# Patient Record
Sex: MALE | Race: WHITE | Employment: OTHER | URBAN - METROPOLITAN AREA
[De-identification: names, ages, dates, MRNs, and addresses within clinical notes are randomized per-mention and may not be internally consistent; named-entity substitution may affect disease eponyms.]

---

## 2017-01-12 ENCOUNTER — OFFICE VISIT (OUTPATIENT)
Dept: FAMILY MEDICINE CLINIC | Age: 57
End: 2017-01-12

## 2017-01-12 VITALS
OXYGEN SATURATION: 96 % | TEMPERATURE: 96.8 F | HEART RATE: 86 BPM | BODY MASS INDEX: 38.78 KG/M2 | SYSTOLIC BLOOD PRESSURE: 128 MMHG | RESPIRATION RATE: 13 BRPM | HEIGHT: 71 IN | DIASTOLIC BLOOD PRESSURE: 91 MMHG | WEIGHT: 277 LBS

## 2017-01-12 DIAGNOSIS — F51.01 PRIMARY INSOMNIA: ICD-10-CM

## 2017-01-12 DIAGNOSIS — E11.8 CONTROLLED TYPE 2 DIABETES MELLITUS WITH COMPLICATION, WITH LONG-TERM CURRENT USE OF INSULIN (HCC): ICD-10-CM

## 2017-01-12 DIAGNOSIS — K21.9 GASTROESOPHAGEAL REFLUX DISEASE WITHOUT ESOPHAGITIS: ICD-10-CM

## 2017-01-12 DIAGNOSIS — E78.00 PURE HYPERCHOLESTEROLEMIA: ICD-10-CM

## 2017-01-12 DIAGNOSIS — I10 ESSENTIAL HYPERTENSION: Primary | ICD-10-CM

## 2017-01-12 DIAGNOSIS — Z79.4 CONTROLLED TYPE 2 DIABETES MELLITUS WITH COMPLICATION, WITH LONG-TERM CURRENT USE OF INSULIN (HCC): ICD-10-CM

## 2017-01-12 DIAGNOSIS — E55.9 VITAMIN D DEFICIENCY: ICD-10-CM

## 2017-01-12 NOTE — PROGRESS NOTES
History of Present Illness  Brandi Rosenthal is a 49 y.o. male who presents today for management of    Chief Complaint   Patient presents with    Hypertension    Cholesterol Problem    Diabetes       Patient still complains of on and off dizziness when bending over. He will have carotid artery ultrasound for work-up of right eye retinopathy. DM - fasting blood sugar [phone]-. He takes Lantus 26 units daily. Home BP readings 140/78, 124/79, 130/94, 94/69, 124/78, 104/70  Patient complains of a foul-smelling nasal discharge from his left nostril. It is associated with headache and facial pressure. He states that it started clearing up. He had surgery for deviated septum. He denies any fever or chills. Problem List  Patient Active Problem List    Diagnosis Date Noted    Vitamin D deficiency 01/12/2017    Status post shoulder surgery 11/01/2016    Diabetes mellitus type 2, controlled ([facility] [address] 03/28/2016    Hyperlipidemia 03/28/2016    Essential hypertension 03/28/2016    Gastroesophageal reflux disease without esophagitis 03/28/2016    Obesity 03/28/2016    Retinopathy 03/28/2016    Primary insomnia 03/28/2016    Vasculogenic erectile dysfunction 03/28/2016       Current Medications  Current Outpatient Prescriptions   Medication Sig Dispense    RANITIDINE HCL (ZANTAC PO) Take  by mouth.  zolpidem (AMBIEN) 10 mg tablet Take 1 Tab by mouth nightly as needed for Sleep. Max Daily Amount: 10 mg. 30 Tab    gabapentin (NEURONTIN) 300 mg capsule Take 1 Cap by mouth three (3) times daily. (Patient taking differently: Take 100 mg by mouth three (3) times daily.) 90 Cap    omeprazole (PRILOSEC) 20 mg capsule Take 2 Caps by mouth daily. 60 Cap    glucose blood VI test strips (BLOOD GLUCOSE TEST) strip Precision Xtra  Use once a day 100 Strip    atorvastatin (LIPITOR) 40 mg tablet Take  by mouth daily.  insulin glargine (LANTUS) 100 unit/mL injection by SubCUTAneous route nightly.  aspirin delayed-release 81 mg tablet Take  by mouth daily.  omega-3 fatty acids-vitamin e 1,000 mg cap Take 1 Cap by mouth.  Glucosamine &Chondroit-MV-Min3 790-365-74-0.5 mg tab Take  by mouth.  bethanie's wort 300 mg cap Take  by mouth.  ascorbic acid (VITAMIN C) 500 mg tablet Take  by mouth.  GINSENG PO Take  by mouth.  CYANOCOBALAMIN, VITAMIN B-12, (VITAMIN B-12 PO) Take  by mouth.  lisinopril (PRINIVIL, ZESTRIL) 20 mg tablet Take 1 Tab by mouth daily. 90 Tab     No current facility-administered medications for this visit. Allergies/Drug Reactions  Allergies   Allergen Reactions    Metformin Other (comments)     GI symptoms        Review of Systems  General ROS: negative for - chills, fatigue or fever  Ophthalmic ROS: negative for - blurry vision  ENT ROS: positive for - nasal discharge  negative for - epistaxis, oral lesions, sore throat, tinnitus or vertigo  Endocrine ROS: negative  Respiratory ROS: no cough, shortness of breath, or wheezing  Cardiovascular ROS: no chest pain or dyspnea on exertion  Gastrointestinal ROS: no abdominal pain, change in bowel habits, or black or bloody stools  positive for - heartburn  Genito-Urinary ROS: no dysuria, trouble voiding, or hematuria  Musculoskeletal ROS: negative  Neurological ROS: negative for - dizziness, headaches, numbness/tingling or weakness      Physical Exam  Vital signs:   Vitals:    01/12/17 1141   BP: (!) 128/91   Pulse: 86   Resp: 13   Temp: 96.8 °F (36 °C)   TempSrc: Oral   SpO2: 96%   Weight: 277 lb (125.6 kg)   Height: 5' 11\" (1.803 m)       General: alert, oriented, not in distress  Chest/Lungs: clear breath sounds, no wheezing or crackles  Heart: normal rate, regular rhythm, no murmur  Abdomen: soft, non-distended, non-tender, normal bowel sounds, no organomegaly, no masses  Extremities: no focal deformities, no edema    Assessment/Plan:    1.  Essential hypertension  - controlled  - continue lisinopril 20mg daily  - low salt diet    2. Controlled type 2 diabetes mellitus with complication, with long-term current use of insulin ([address]  - controlled  - had recent blood work at the [place] - no results yet  - continue Lantus 26 units daily    3. Pure hypercholesterolemia  - contnue Lipitor  - low cholesterol diet    4. Gastroesophageal reflux disease without esophagitis  - continue omeprazole    5. Primary insomnia  - continue Ambien as needed    6. Vitamin D deficiency  - needs repeat vitamin D level check  - continue vitamin D 1000 units daily      Follow-up Disposition:  Return in about 6 months (around 7/12/2017) for rov. I have discussed the diagnosis with the patient and the intended plan as seen in the above orders. The patient has received an after-visit summary and questions were answered concerning future plans. I have discussed medication side effects and warnings with the patient as well. I have reviewed the plan of care with the patient, accepted their input and they are in agreement with the treatment goals.        Ena Leigh MD  January 12, 2017

## 2017-01-12 NOTE — PROGRESS NOTES
Patient presents to clinic for routine follow up. Patient states he will be going through eye surgery on February 7, 2017 with the [address] Coordination of Care:    1. Have you been to the ER, urgent care clinic since your last visit? Hospitalized since your last visit? No     2. Have you seen or consulted any other health care providers outside of the [address] [place] [address] since your last visit? Include any pap smears or colon screening. [facility]     Depression Screening completed. Learning Assessment completed. Abuse Screening completed. Health Maintenance reviewed and discussed per provider.

## 2017-01-12 NOTE — MR AVS SNAPSHOT
Visit Information Date & Time Provider Department Dept. Phone Encounter #  
 1/12/2017 11:45 AM Iza De Jesus [id number] [phone] [id number] Follow-up Instructions Return in about 6 months (around 7/12/2017) for rov. Your Appointments 6/20/2017 11:30 AM  
COMPLETE PHYSICAL with Iza De Jesus MD  
[facility] [address] [place] [address]) Appt Note: Return in 1 year for Physical  
 [address]  
  
   
 [address] [place] [address] [address] Upcoming Health Maintenance Date Due Hepatitis C Screening 1960 EYE EXAM RETINAL OR DILATED Q1 4/1/1972 Pneumococcal 19-64 Medium Risk (1 of 1 - PPSV23) 4/1/1981 DTaP/Tdap/Td series (1 - Tdap) 4/1/1983 INFLUENZA AGE 9 TO ADULT 8/1/2016 FOOT EXAM Q1 8/31/2016 HEMOGLOBIN A1C Q6M 9/30/2016 MICROALBUMIN Q1 3/30/2017 LIPID PANEL Q1 3/30/2017 COLONOSCOPY 8/5/2024 Allergies as of 1/12/2017  Review Complete On: 1/12/2017 By: Iza De Jesus MD  
  
 Severity Noted Reaction Type Reactions Metformin  03/28/2016    Other (comments) GI symptoms Current Immunizations  Never Reviewed No immunizations on file. Not reviewed this visit You Were Diagnosed With   
  
 Codes Comments Essential hypertension    -  Primary ICD-10-CM: I10 
ICD-9-CM: 401.9 Controlled type 2 diabetes mellitus with complication, with long-term current use of insulin (HCC)     ICD-10-CM: E11.8, Z79.4 ICD-9-CM: 250.90, V58.67 Pure hypercholesterolemia     ICD-10-CM: E78.00 ICD-9-CM: 272.0 Gastroesophageal reflux disease without esophagitis     ICD-10-CM: K21.9 ICD-9-CM: 530.81 Primary insomnia     ICD-10-CM: F51.01 
ICD-9-CM: 307.42 Vitamin D deficiency     ICD-10-CM: E55.9 ICD-9-CM: 268.9 Vitals BP Pulse Temp Resp Height(growth percentile) Weight(growth percentile) (!) 128/91 (BP 1 Location: Left arm, BP Patient Position: Sitting) 86 96.8 °F (36 °C) (Oral) 13 5' 11\" (1.803 m) 277 lb (125.6 kg) SpO2 BMI Smoking Status 96% 38.63 kg/m2 Former Smoker Vitals History BMI and BSA Data Body Mass Index Body Surface Area  
 38.63 kg/m 2 2.51 m 2 Your Updated Medication List  
  
   
This list is accurate as of: 1/12/17 12:08 PM.  Always use your most recent med list.  
  
  
  
  
 ascorbic acid (vitamin C) 500 mg tablet Commonly known as:  VITAMIN C Take  by mouth. aspirin delayed-release 81 mg tablet Take  by mouth daily. atorvastatin 40 mg tablet Commonly known as:  LIPITOR Take  by mouth daily. gabapentin 300 mg capsule Commonly known as:  NEURONTIN Take 1 Cap by mouth three (3) times daily. GINSENG PO Take  by mouth. Glucosamine &Chondroit-MV-Min3 428-713-04-0.5 mg Tab Take  by mouth. glucose blood VI test strips strip Commonly known as:  blood glucose test  
Precision Xtra Use once a day  
  
 insulin glargine 100 unit/mL injection Commonly known as:  LANTUS  
by SubCUTAneous route nightly. lisinopril 20 mg tablet Commonly known as:  Arnold Files Take 1 Tab by mouth daily. omega-3 fatty acids-vitamin e 1,000 mg Cap Take 1 Cap by mouth. omeprazole 20 mg capsule Commonly known as:  PRILOSEC Take 2 Caps by mouth daily. bethanie's wort 300 mg Cap Take  by mouth. VITAMIN B-12 PO Take  by mouth. ZANTAC PO Take  by mouth.  
  
 zolpidem 10 mg tablet Commonly known as:  AMBIEN Take 1 Tab by mouth nightly as needed for Sleep. Max Daily Amount: 10 mg. Follow-up Instructions Return in about 6 months (around 7/12/2017) for rov. Introducing [facility]! Dear Zi Santillan: Thank you for requesting a [employer] account. Our records indicate that you already have an active [employer] account.   You can access your account anytime at [email or web address] Did you know that you can access your hospital and ER discharge instructions at any time in [employer]? You can also review all of your test results from your hospital stay or ER visit. Additional Information If you have questions, please visit the Frequently Asked Questions section of the [employer] website at [email or web address] Remember, [employer] is NOT to be used for urgent needs. For medical emergencies, dial 911. Now available from your iPhone and Android! Please provide this summary of care documentation to your next provider. Your primary care clinician is listed as Lauren Galeano. If you have any questions after today's visit, please call [phone].